# Patient Record
(demographics unavailable — no encounter records)

---

## 2025-04-23 NOTE — CONSULT LETTER
[Dear  ___] : Dear  [unfilled], [Courtesy Letter:] : I had the pleasure of seeing your patient, [unfilled], in my office today. [Please see my note below.] : Please see my note below. [Sincerely,] : Sincerely, [FreeTextEntry2] : Rossy Arce [FreeTextEntry3] : Reynold Feng MD, JU, FACS  Department Otolaryngology Director of [facility] Professor of Otolaryngology,  [employer] [employer] [facility]

## 2025-04-23 NOTE — REASON FOR VISIT
[Initial Evaluation] : an initial evaluation for [FreeTextEntry2] : former patient of Dr. Feng, here for possible dislodged ear tube

## 2025-04-23 NOTE — HISTORY OF PRESENT ILLNESS
[No change in the review of systems as noted in prior visit date ___] : No change in the review of systems as noted in prior visit date of [unfilled] [de-identified] : 5 year old female former patient of Dr. Feng, here for possible dislodged ear tube.  S/p Bilateral myringotomy and tubes 8/11/22.

## 2025-04-23 NOTE — ADDENDUM
[FreeTextEntry1] :  scribe attestation; I, Joshua Osullivan, am scribing for and in the presence of Dr. Reynold Feng in the following sections HISTORY OF PRESENT ILLNESS, PAST MEDICAL/FAMILY/SOCIAL HISTORY; REVIEW OF SYSTEMS; VITAL SIGNS; PHYSICAL EXAM; PROCEDURES; DISCUSSION.   I, Dr. Reynold Feng, personally performed the services described in the documentation, reviewed the documentation recorded by the scribe in my presence, and it accurately and completely records my words and actions.

## 2025-04-23 NOTE — PHYSICAL EXAM
[Partial] : partial cerumen impaction [Placement/Patency] : tympanostomy tube in place and patent [Clear/Ventilated] : middle ear clear and well ventilated [Clear to Auscultation] : lungs were clear to auscultation bilaterally [Normal Gait and Station] : normal gait and station [Normal muscle strength, symmetry and tone of facial, head and neck musculature] : normal muscle strength, symmetry and tone of facial, head and neck musculature [Normal] : no cervical lymphadenopathy [Effusion] : no effusion [Exposed Vessel] : left anterior vessel not exposed [Wheezing] : no wheezing [Increased Work of Breathing] : no increased work of breathing with use of accessory muscles and retractions [de-identified] : Mother here for checkup on myringotomy tubes.  [FreeTextEntry6] : foreign body in the ear